# Patient Record
Sex: FEMALE | Race: BLACK OR AFRICAN AMERICAN | NOT HISPANIC OR LATINO | Employment: PART TIME | URBAN - METROPOLITAN AREA
[De-identification: names, ages, dates, MRNs, and addresses within clinical notes are randomized per-mention and may not be internally consistent; named-entity substitution may affect disease eponyms.]

---

## 2019-12-09 PROBLEM — Z51.5 PALLIATIVE CARE BY SPECIALIST: Status: ACTIVE | Noted: 2019-12-09

## 2019-12-09 PROBLEM — D57.1 SICKLE CELL DISEASE WITHOUT CRISIS: Status: ACTIVE | Noted: 2019-12-09

## 2019-12-11 PROBLEM — F11.90 CHRONIC, CONTINUOUS USE OF OPIOIDS: Status: ACTIVE | Noted: 2019-12-11

## 2019-12-11 PROBLEM — E55.9 VITAMIN D DEFICIENCY: Status: ACTIVE | Noted: 2019-12-11

## 2019-12-11 PROBLEM — M62.838 MUSCLE SPASM OF LEFT LOWER EXTREMITY: Status: ACTIVE | Noted: 2019-12-11

## 2019-12-11 PROBLEM — L97.925: Status: ACTIVE | Noted: 2019-12-11

## 2019-12-11 PROBLEM — I82.90 VTE (VENOUS THROMBOEMBOLISM): Status: ACTIVE | Noted: 2019-12-11

## 2020-01-06 PROBLEM — I82.409 ACUTE DVT (DEEP VENOUS THROMBOSIS): Status: ACTIVE | Noted: 2019-06-24

## 2020-02-03 PROBLEM — Z86.718 HISTORY OF DVT IN ADULTHOOD: Status: ACTIVE | Noted: 2020-02-03

## 2020-03-30 PROBLEM — S81.802A WOUND OF LEFT LEG: Status: ACTIVE | Noted: 2020-03-30

## 2020-05-29 PROBLEM — D57.00 VASO-OCCLUSIVE PAIN DUE TO SICKLE CELL DISEASE: Status: ACTIVE | Noted: 2020-05-29

## 2020-05-31 PROBLEM — D57.00 VASO-OCCLUSIVE PAIN DUE TO SICKLE CELL DISEASE: Status: RESOLVED | Noted: 2020-05-29 | Resolved: 2020-05-31

## 2020-07-30 PROBLEM — D57.1 SICKLE CELL DISEASE WITHOUT CRISIS: Status: RESOLVED | Noted: 2019-12-09 | Resolved: 2020-07-30

## 2020-07-30 PROBLEM — Z76.0 ENCOUNTER FOR MEDICATION REFILL: Status: ACTIVE | Noted: 2020-07-30

## 2020-09-21 PROBLEM — D57.00 SICKLE CELL PAIN CRISIS: Status: ACTIVE | Noted: 2020-09-21

## 2020-09-21 PROBLEM — I82.532 CHRONIC DEEP VEIN THROMBOSIS (DVT) OF POPLITEAL VEIN OF LEFT LOWER EXTREMITY: Status: ACTIVE | Noted: 2019-12-11

## 2020-09-21 PROBLEM — I82.512 CHRONIC DEEP VEIN THROMBOSIS (DVT) OF FEMORAL VEIN OF LEFT LOWER EXTREMITY: Status: ACTIVE | Noted: 2019-12-11

## 2020-09-21 PROBLEM — Z79.01 CHRONIC ANTICOAGULATION: Status: ACTIVE | Noted: 2019-06-24

## 2020-09-22 PROBLEM — L97.929 SKIN ULCER OF LEFT LOWER LEG: Status: ACTIVE | Noted: 2019-12-11

## 2021-05-12 ENCOUNTER — PATIENT MESSAGE (OUTPATIENT)
Dept: RESEARCH | Facility: HOSPITAL | Age: 36
End: 2021-05-12

## 2021-08-29 PROBLEM — D64.9 ANEMIA: Status: ACTIVE | Noted: 2021-08-29

## 2023-03-18 PROBLEM — Z86.718 HISTORY OF DVT IN ADULTHOOD: Status: RESOLVED | Noted: 2020-02-03 | Resolved: 2023-03-18

## 2023-03-18 PROBLEM — I82.409 ACUTE DVT (DEEP VENOUS THROMBOSIS): Status: RESOLVED | Noted: 2019-06-24 | Resolved: 2023-03-18

## 2023-03-19 PROBLEM — R50.9 FEVER: Status: ACTIVE | Noted: 2023-03-19

## 2023-03-19 PROBLEM — R60.0 HAND EDEMA: Status: ACTIVE | Noted: 2023-03-19

## 2023-03-20 PROBLEM — R60.0 HAND EDEMA: Status: RESOLVED | Noted: 2023-03-19 | Resolved: 2023-03-20

## 2023-03-22 ENCOUNTER — PATIENT OUTREACH (OUTPATIENT)
Dept: ADMINISTRATIVE | Facility: CLINIC | Age: 38
End: 2023-03-22

## 2023-03-22 PROBLEM — I82.4Y9 ACUTE DEEP VEIN THROMBOSIS (DVT) OF PROXIMAL VEIN OF LOWER EXTREMITY: Status: ACTIVE | Noted: 2019-06-24

## 2023-03-22 NOTE — PROGRESS NOTES
C3 nurse attempted to contact Jessy Martinez for a TCC post hospital discharge follow up call. The patient is unable to conduct the call @ this time. The patient requested a callback.    The patient does not have a scheduled HOSFU appointment within 5-7 days post hospital discharge date 3/21/23. [facility]

## 2023-03-23 ENCOUNTER — PATIENT MESSAGE (OUTPATIENT)
Dept: ADMINISTRATIVE | Facility: CLINIC | Age: 38
End: 2023-03-23

## 2023-03-23 NOTE — PROGRESS NOTES
C3 nurse 2nd attempt to contact Jessy Martinez for a TCC post hospital discharge follow up call. No answer. The patient does not have a scheduled HOSFU appointment, and the pt does not have an [facility] PCP.

## 2024-03-03 PROBLEM — L03.116 CELLULITIS OF LEFT LOWER EXTREMITY: Status: ACTIVE | Noted: 2024-03-03

## 2024-03-03 PROBLEM — L97.922 CHRONIC ULCER OF LEFT LOWER EXTREMITY WITH FAT LAYER EXPOSED: Status: ACTIVE | Noted: 2024-03-03

## 2024-03-03 PROBLEM — E87.6 HYPOKALEMIA: Status: ACTIVE | Noted: 2024-03-03

## 2024-03-04 PROBLEM — I83.009 VENOUS ULCER: Status: ACTIVE | Noted: 2024-03-04

## 2024-03-04 PROBLEM — L97.909 VENOUS ULCER: Status: ACTIVE | Noted: 2024-03-04

## 2024-03-06 PROBLEM — E87.6 HYPOKALEMIA: Status: RESOLVED | Noted: 2024-03-03 | Resolved: 2024-03-06

## 2024-03-06 PROBLEM — L97.922 CHRONIC ULCER OF LEFT LOWER EXTREMITY WITH FAT LAYER EXPOSED: Status: RESOLVED | Noted: 2024-03-03 | Resolved: 2024-03-06
